# Patient Record
Sex: FEMALE | Race: OTHER | URBAN - METROPOLITAN AREA
[De-identification: names, ages, dates, MRNs, and addresses within clinical notes are randomized per-mention and may not be internally consistent; named-entity substitution may affect disease eponyms.]

---

## 2021-08-31 ENCOUNTER — WEB ENCOUNTER (OUTPATIENT)
Dept: URBAN - METROPOLITAN AREA CLINIC 25 | Facility: CLINIC | Age: 56
End: 2021-08-31

## 2021-08-31 ENCOUNTER — OFFICE VISIT (OUTPATIENT)
Dept: URBAN - METROPOLITAN AREA CLINIC 25 | Facility: CLINIC | Age: 56
End: 2021-08-31
Payer: COMMERCIAL

## 2021-08-31 VITALS
BODY MASS INDEX: 24.32 KG/M2 | WEIGHT: 146 LBS | HEART RATE: 80 BPM | HEIGHT: 65 IN | TEMPERATURE: 97.5 F | DIASTOLIC BLOOD PRESSURE: 62 MMHG | SYSTOLIC BLOOD PRESSURE: 110 MMHG

## 2021-08-31 DIAGNOSIS — R10.13 EPIGASTRIC PAIN: ICD-10-CM

## 2021-08-31 DIAGNOSIS — K44.9 HIATAL HERNIA: ICD-10-CM

## 2021-08-31 DIAGNOSIS — R14.0 BLOATING: ICD-10-CM

## 2021-08-31 PROCEDURE — 99204 OFFICE O/P NEW MOD 45 MIN: CPT | Performed by: INTERNAL MEDICINE

## 2021-08-31 RX ORDER — OMEPRAZOLE 40 MG/1
1 CAPSULE 30 MINUTES BEFORE MORNING MEAL CAPSULE, DELAYED RELEASE ORAL ONCE A DAY
Qty: 30 CAPSULE | Refills: 4 | OUTPATIENT
Start: 2021-08-31

## 2021-08-31 RX ORDER — SIMETHICONE 250 MG/1
1 CAPSULE AFTER MEALS AND AT BEDTIME AS NEEDED CAPSULE, GELATIN COATED ORAL TWICE A DAY
Qty: 60 CAPSULE | Refills: 5 | OUTPATIENT
Start: 2021-08-31 | End: 2022-02-26

## 2021-08-31 NOTE — HPI-TODAY'S VISIT:
57 yo pt with hx of hiatal hernia dx'd 20 yrs ago presents for further evaluation due to epigastric pain and burping. Sxs have been present for yrs but more frequent and now burns. Pt has tried chewable pepcid does help and previously on zantac which also helps. Her mother has a large hiatal hernia and that concerns her.

## 2021-09-22 ENCOUNTER — CLAIMS CREATED FROM THE CLAIM WINDOW (OUTPATIENT)
Dept: URBAN - METROPOLITAN AREA CLINIC 4 | Facility: CLINIC | Age: 56
End: 2021-09-22
Payer: COMMERCIAL

## 2021-09-22 ENCOUNTER — OFFICE VISIT (OUTPATIENT)
Dept: URBAN - METROPOLITAN AREA SURGERY CENTER 20 | Facility: SURGERY CENTER | Age: 56
End: 2021-09-22
Payer: COMMERCIAL

## 2021-09-22 DIAGNOSIS — R10.13 ABDOMINAL DISCOMFORT, EPIGASTRIC: ICD-10-CM

## 2021-09-22 DIAGNOSIS — K25.9 ANTRAL ULCER: ICD-10-CM

## 2021-09-22 DIAGNOSIS — K31.89 ACQUIRED DEFORMITY OF DUODENUM: ICD-10-CM

## 2021-09-22 DIAGNOSIS — K31.89 NONSURGICAL DUMPING SYNDROME: ICD-10-CM

## 2021-09-22 PROCEDURE — 88305 TISSUE EXAM BY PATHOLOGIST: CPT | Performed by: PATHOLOGY

## 2021-09-22 PROCEDURE — G8907 PT DOC NO EVENTS ON DISCHARG: HCPCS | Performed by: INTERNAL MEDICINE

## 2021-09-22 PROCEDURE — 43239 EGD BIOPSY SINGLE/MULTIPLE: CPT | Performed by: INTERNAL MEDICINE

## 2021-09-22 RX ORDER — SIMETHICONE 250 MG/1
1 CAPSULE AFTER MEALS AND AT BEDTIME AS NEEDED CAPSULE, GELATIN COATED ORAL TWICE A DAY
Qty: 60 CAPSULE | Refills: 5 | Status: ACTIVE | COMMUNITY
Start: 2021-08-31 | End: 2022-02-26

## 2021-09-22 RX ORDER — OMEPRAZOLE 40 MG/1
1 CAPSULE 30 MINUTES BEFORE MORNING MEAL CAPSULE, DELAYED RELEASE ORAL ONCE A DAY
Qty: 30 CAPSULE | Refills: 4 | Status: ACTIVE | COMMUNITY
Start: 2021-08-31

## 2021-10-21 ENCOUNTER — WEB ENCOUNTER (OUTPATIENT)
Dept: URBAN - METROPOLITAN AREA CLINIC 25 | Facility: CLINIC | Age: 56
End: 2021-10-21

## 2021-10-21 ENCOUNTER — DASHBOARD ENCOUNTERS (OUTPATIENT)
Age: 56
End: 2021-10-21

## 2021-10-21 ENCOUNTER — OFFICE VISIT (OUTPATIENT)
Dept: URBAN - METROPOLITAN AREA CLINIC 25 | Facility: CLINIC | Age: 56
End: 2021-10-21
Payer: COMMERCIAL

## 2021-10-21 VITALS
HEIGHT: 65 IN | HEART RATE: 85 BPM | BODY MASS INDEX: 23.99 KG/M2 | SYSTOLIC BLOOD PRESSURE: 121 MMHG | DIASTOLIC BLOOD PRESSURE: 68 MMHG | WEIGHT: 144 LBS | TEMPERATURE: 97.3 F

## 2021-10-21 DIAGNOSIS — R14.0 BLOATING: ICD-10-CM

## 2021-10-21 DIAGNOSIS — K44.9 HIATAL HERNIA: ICD-10-CM

## 2021-10-21 DIAGNOSIS — R10.13 EPIGASTRIC PAIN: ICD-10-CM

## 2021-10-21 PROBLEM — 84089009: Status: ACTIVE | Noted: 2021-08-31

## 2021-10-21 PROBLEM — 79922009: Status: ACTIVE | Noted: 2021-09-01

## 2021-10-21 PROBLEM — 116289008: Status: ACTIVE | Noted: 2021-08-31

## 2021-10-21 PROCEDURE — 99213 OFFICE O/P EST LOW 20 MIN: CPT | Performed by: INTERNAL MEDICINE

## 2021-10-21 RX ORDER — OMEPRAZOLE 40 MG/1
1 CAPSULE 30 MINUTES BEFORE MORNING MEAL CAPSULE, DELAYED RELEASE ORAL ONCE A DAY
Qty: 30 CAPSULE | Refills: 4 | Status: ACTIVE | COMMUNITY
Start: 2021-08-31

## 2021-10-21 RX ORDER — HYOSCYAMINE SULFATE 0.12 MG/1
1 TABLET TABLET ORAL THREE TIMES A DAY
Qty: 90 TABLET | Refills: 4 | OUTPATIENT
Start: 2021-10-21 | End: 2022-03-20

## 2021-10-21 RX ORDER — SIMETHICONE 250 MG/1
1 CAPSULE AFTER MEALS AND AT BEDTIME AS NEEDED CAPSULE, GELATIN COATED ORAL TWICE A DAY
Qty: 60 CAPSULE | Refills: 5 | Status: ACTIVE | COMMUNITY
Start: 2021-08-31 | End: 2022-02-26

## 2021-10-21 NOTE — HPI-TODAY'S VISIT:
57 yo pt presents for f/u of bloating/gas. She is without new complaints s/p egd with normal biopsy and healing ulcer.

## 2022-07-21 ENCOUNTER — OFFICE VISIT (OUTPATIENT)
Dept: URBAN - METROPOLITAN AREA SURGERY CENTER 16 | Facility: SURGERY CENTER | Age: 57
End: 2022-07-21
Payer: COMMERCIAL

## 2022-07-21 DIAGNOSIS — Z12.11 COLON CANCER SCREENING: ICD-10-CM

## 2022-07-21 PROCEDURE — G0121 COLON CA SCRN NOT HI RSK IND: HCPCS | Performed by: INTERNAL MEDICINE

## 2022-07-21 PROCEDURE — G8907 PT DOC NO EVENTS ON DISCHARG: HCPCS | Performed by: INTERNAL MEDICINE
